# Patient Record
Sex: MALE | Race: WHITE | NOT HISPANIC OR LATINO | Employment: OTHER | URBAN - METROPOLITAN AREA
[De-identification: names, ages, dates, MRNs, and addresses within clinical notes are randomized per-mention and may not be internally consistent; named-entity substitution may affect disease eponyms.]

---

## 2024-02-05 DIAGNOSIS — R41.3 MEMORY LOSS: Primary | ICD-10-CM

## 2024-02-05 DIAGNOSIS — Q90.9 DOWN SYNDROME: ICD-10-CM

## 2024-05-15 ENCOUNTER — OFFICE VISIT (OUTPATIENT)
Dept: NEUROLOGY | Facility: CLINIC | Age: 29
End: 2024-05-15
Payer: MEDICARE

## 2024-05-15 VITALS
BODY MASS INDEX: 39.93 KG/M2 | HEIGHT: 62 IN | WEIGHT: 217 LBS | SYSTOLIC BLOOD PRESSURE: 130 MMHG | DIASTOLIC BLOOD PRESSURE: 80 MMHG

## 2024-05-15 DIAGNOSIS — G47.33 OBSTRUCTIVE SLEEP APNEA: Primary | ICD-10-CM

## 2024-05-15 DIAGNOSIS — R41.3 MEMORY LOSS: ICD-10-CM

## 2024-05-15 DIAGNOSIS — Q90.9 DOWN SYNDROME: ICD-10-CM

## 2024-05-15 PROCEDURE — 99999 PR PBB SHADOW E&M-EST. PATIENT-LVL III: CPT | Mod: PBBFAC,,, | Performed by: PSYCHIATRY & NEUROLOGY

## 2024-05-15 PROCEDURE — 99213 OFFICE O/P EST LOW 20 MIN: CPT | Mod: PBBFAC | Performed by: PSYCHIATRY & NEUROLOGY

## 2024-05-15 PROCEDURE — 99205 OFFICE O/P NEW HI 60 MIN: CPT | Mod: S$PBB,,, | Performed by: PSYCHIATRY & NEUROLOGY

## 2024-05-15 RX ORDER — LEVOTHYROXINE SODIUM 125 UG/1
125 TABLET ORAL
COMMUNITY
Start: 2024-05-02

## 2024-05-15 RX ORDER — ERGOCALCIFEROL 1.25 1/1
50000 CAPSULE ORAL
COMMUNITY
Start: 2023-12-28

## 2024-05-15 RX ORDER — FERROUS SULFATE 325(65) MG
TABLET ORAL
COMMUNITY
Start: 2024-03-21

## 2024-05-15 NOTE — PROGRESS NOTES
Subjective     Patient ID: Cleo Maciel is a 29 y.o. male.    Chief Complaint: Memory Loss (New pt- memory loss - referred by Dr. Bandar Warner)    HPI  Trisomy 21  Referred for memory loss and abnormal mri brain  Several months of memory loss; not progressive; less ability to read; generally speaking functioned on a word recognition with limited verbal output at baseline  Still able to prepare simple food, cut grass from start to finish, play bienvenido bal,e tc  No personality changes  One mechanical fall while helping to build a fence    Snores/EDS  [employer] 10  Review of Systems  The remainder of the 14 system ROS is noncontributory or negative unless mentioned/reviewed above.       Objective     Physical Exam  Mmse not able to complete  Mental Status: Alert and oriented x2. Language is fluent with good comprehension.    Cranial Nerve: Pupils are equal, round, and reactive to light. Visual fields are intact to confrontation. Normal fundi. Ocular movements are intact. Face is symmetric at rest and with activation with intact sensation throughout. Hearing intact to finger rub bilaterally. Muscles of tongue and palate activate symmetrically. No dysarthria. Strength is full in sternocleidomastoid and trapezius bilaterally.    Motor: Muscle bulk and tone are normal. Strength is 5/5 in all four extremities both proximally and distally. Intact fine motor movements bilaterally. There is no pronator drift or satelliting on arm roll.    Sensory: Sensation is intact to light touch, pinprick, vibration, and proprioception throughout. Romberg is negative.    Reflexes: 2+ and symmetric at the biceps, triceps, brachioradialis, patella, and Achilles bilaterally. Plantar response is flexor bilaterally.    Coordination: No dysmetria on finger-nose-finger or heel-knee-shin. Normal rapid alternating movements. Fast finger tapping with normal amplitude and speed.    Gait: Narrow based with normal stride length and good arm swing  bilaterally. Able to walk on heels, toes, and in tandem.  Mall 4  Neck 18     Assessment and Plan     1. Obstructive sleep apnea  -     Home Sleep Study; Future    2. Memory loss  -     Ambulatory referral/consult to Neurology    3. 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